# Patient Record
Sex: MALE | Race: WHITE | Employment: PART TIME | URBAN - METROPOLITAN AREA
[De-identification: names, ages, dates, MRNs, and addresses within clinical notes are randomized per-mention and may not be internally consistent; named-entity substitution may affect disease eponyms.]

---
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
## 2022-07-11 ENCOUNTER — OFFICE VISIT (OUTPATIENT)
Dept: ORTHOPEDIC SURGERY | Age: 22
End: 2022-07-11
Payer: COMMERCIAL

## 2022-07-11 VITALS — BODY MASS INDEX: 31.29 KG/M2 | HEIGHT: 77 IN | WEIGHT: 265 LBS

## 2022-07-11 DIAGNOSIS — M23.200 OLD PERIPHERAL TEAR OF LATERAL MENISCUS OF RIGHT KNEE: ICD-10-CM

## 2022-07-11 DIAGNOSIS — M25.561 ACUTE PAIN OF RIGHT KNEE: Primary | ICD-10-CM

## 2022-07-11 PROCEDURE — 99204 OFFICE O/P NEW MOD 45 MIN: CPT | Performed by: ORTHOPAEDIC SURGERY

## 2022-07-11 NOTE — PROGRESS NOTES
ASSESSMENT/PLAN:  Below is the assessment and plan developed based on review of pertinent history, physical exam, labs, studies, and medications. 1. Acute pain of right knee  -     XR KNEE RT MIN 4 V; Future  2. Old peripheral tear of lateral meniscus of right knee  -     MRI KNEE RT WO CONT; Future      Return for Follow-up after diagnostic test.    In discussion with the patient, we considered the numerus possible diagnoses that could be contributing to their present symptoms. We also deliberated on the extensive management options that must be considered to treat their current condition. We reviewed their accessible prior medical records, diagnostic tests, and current health and employment information. We considered how these symptoms were affecting the patient´s activities of daily living as well as employment and fitness activities. The patient had various questions regarding the possible risks, benefits, complications, morbidity and mortality regarding their diagnosis and treatment options. The patients´ comorbidities were considered, and I advocated that they consider maximizing lifestyle modification through nutrition and exercise to aid in addressing their symptoms. Shared decision making yielded an understanding to move forward with conservation treatment preferences. The patient expressed understanding that if conservative management fails to alleviate the present symptoms they will return to office for re-evaluation and consideration of additional diagnostic tests and potential surgical options. In the interim, we have recommended ice, elevation, and take prescription anti-inflammatory medications along with a physician directed home exercise program. We discussed the risks and common side effects of anti-inflammatory medications and instructed the patient to discontinue the medication and contact us if they experienced any side effects.  The patient was encouraged to discuss the possible side effects with their family physician or pharmacist prior to initiating any new medications. We discussed the fact that many of the recommended treatment options presented are significantly limited by the patient´s social determinants of health. We also reviewed the circumstances surrounding the environment that they live and work which affect a wide range of health risk. We considered the limited access to appropriate educational resources regarding proper nutrition and exercise as well as the economic and social support necessary to maintain health and wellbeing. Given that the patient's symptoms are increasing in frequency and duration, we have decided to evaluate the etiology of the pain and loss of function with an MRI. We discussed the risks of an MRI which include, but are not limited to the enclosed space, noisy environment, magnetic effect on implanted metal. We also talked about the fact that MRI is also contraindicated in the presence of internal metallic objects such as bullets or shrapnel, as well as surgical clips, pins, plates, screws, metal sutures, or wire mesh. We talked about the fact that MRI does not use radiation, but it may be contrindicated if the patient has implanted pacemakers, intracranial aneurysm clips, cochlear implants, certain prosthetic devices, implanted drug infusion pumps, neurostimulators, bone-growth stimulators, certain intrauterine contraceptive devices; or any other type of iron-based metal implants. We discussed the fact that if you are pregnant or suspect that you may be pregnant, you should notify your physician and consult with your primary care or obstetrician before having an MRI. Although rare, we talked about the fact that if contrast dye is used, there is a risk for allergic reaction to the dye.  Patients who are allergic to or sensitive to medications, contrast dye, iodine, or shellfish should notify the radiologist or technologist prior to the administration of dye. MRI contrast may also influence other conditions such as allergies, asthma, anemia, hypotension (low blood pressure), and sickle cell disease. The patient has expressed understanding of these risks and I will see the patient back after the MRI to discuss the findings as well as the treatment options. We talked about the fact that was concern for recurrence of his lateral meniscus tear of his right knee. We will proceed with an MRI of his right knee to further evaluate his meniscus. We went over multiple scenarios on return to play. I am happy to talk to his parents. SUBJECTIVE/OBJECTIVE:  Fitz Yates (: 2000) is a 22 y.o. male, patient,here for evaluation of the Knee Pain (right)  . Patient presents today for evaluation of his right knee. He states that he had a right knee arthroscopy with lateral meniscus repair in 2021 in [place]. States that he did well initially but has continued to have some sharp pain on the lateral aspect of the knee on a daily basis. He states that the pain is short-lived but it is sharp. States that he notices when he gets up from a seated position or when he rotates. He denies any catching or locking. He denies any pain that goes down the leg and denies any numbness or tingling. 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    Physical Exam    General: Well-dressed well-nourished male in no acute distress, alert and oriented x3  Right knee: Small effusion on exam.  Does have some mild prepatellar bursitis. Motion from 0 to 135 degrees. Mild medial joint line tenderness along with some moderate lateral joint line tenderness. Knee is stable to varus valgus stress testing. Negative Lachman's, negative anterior/posterior drawer. Calf is soft and supple without swelling or tenderness. Sensation is intact to light touch over the leg and distally in the foot.     Imagin views of the right straits no fractures or dislocations. Well-maintained medial, lateral, patellofemoral joint spaces. No Known Allergies    No current outpatient medications on file. No current facility-administered medications for this visit. History reviewed. No pertinent past medical history. Past Surgical History:   Procedure Laterality Date    HX KNEE ARTHROSCOPY         Family History   Problem Relation Age of Onset    No Known Problems Mother     No Known Problems Father     No Known Problems Maternal Grandmother     No Known Problems Maternal Grandfather     No Known Problems Paternal Grandmother     No Known Problems Paternal Grandfather        Social History     Socioeconomic History    Marital status: SINGLE     Spouse name: Not on file    Number of children: Not on file    Years of education: Not on file    Highest education level: Not on file   Occupational History    Not on file   Tobacco Use    Smoking status: Current Some Day Smoker    Smokeless tobacco: Never Used   Vaping Use    Vaping Use: Never used   Substance and Sexual Activity    Alcohol use: Yes    Drug use: No    Sexual activity: Never   Other Topics Concern    Not on file   Social History Narrative    Not on file     Social Determinants of Health     Financial Resource Strain:     Difficulty of Paying Living Expenses: Not on file   Food Insecurity:     Worried About Running Out of Food in the Last Year: Not on file    [place] of Food in the Last Year: Not on file   Transportation Needs:     Lack of Transportation (Medical): Not on file    Lack of Transportation (Non-Medical):  Not on file   Physical Activity:     Days of Exercise per Week: Not on file    Minutes of Exercise per Session: Not on file   Stress:     Feeling of Stress : Not on file   Social Connections:     Frequency of Communication with Friends and Family: Not on file    Frequency of Social Gatherings with Friends and Family: Not on file    Attends Scientologist Services: Not on file    Active Member of Clubs or Organizations: Not on file    Attends Club or Organization Meetings: Not on file    Marital Status: Not on file   Intimate Partner Violence:     Fear of Current or Ex-Partner: Not on file    Emotionally Abused: Not on file    Physically Abused: Not on file    Sexually Abused: Not on file   Housing Stability:     Unable to Pay for Housing in the Last Year: Not on file    Number of [place] in the Last Year: Not on file    Unstable Housing in the Last Year: Not on file       Review of Systems    No flowsheet data found. Vitals:  Ht 6' 5\" (1.956 m)   Wt 265 lb (120.2 kg)   BMI 31.42 kg/m²    Body mass index is 31.42 kg/m². An electronic signature was used to authenticate this note.   -- Alida Vasquez MD [facility] Pregnancy [facility] Lactation Text: The risk during pregnancy and breastfeeding is uncertain with this medication.

## 2022-07-15 PROBLEM — S83.281A TEAR OF LATERAL MENISCUS OF RIGHT KNEE: Status: ACTIVE | Noted: 2022-07-15

## 2022-07-15 NOTE — PROGRESS NOTES
ASSESSMENT/PLAN:  Below is the assessment and plan developed based on review of pertinent history, physical exam, labs, studies, and medications. 1. Acute pain of right knee  -     XR KNEE RT MIN 4 V; Future  2. Old peripheral tear of lateral meniscus of right knee  -     MRI KNEE RT WO CONT; Future      We discussed the treatment options for the patient´s diagnosis, which included: living with the extremity as it is, organized exercises, medicines, injections, and surgical options. Utilizing shared treatment decision-making; we also discussed the nature and purpose of the treatment options along with the expected risks and benefits. The patient has expressed a desire to proceed with surgery, and I think that is a reasonable option. I educated the patient regarding the inherent and unavoidable risks which include, but are not limited to anesthesia, infection, damage to nerves and blood vessels, blood loss, blood clots, and even death were discussed at length. We also talked about the possibility of not being able to return to prior activities or employment, the need for future surgery, and complex regional pain syndrome. The patient expressed understanding of these risks and has elected to proceed with surgery. Ample time was given for questions, of which many were addressed. We have discussed the surgical procedure as well as the realistic expectations regarding the risks, outcome and post-operative protocol. We will set this up when it is convenient for the patient. We have instructed them to contact us if there are any questions or concerns between now and their date of surgery. We talked about procedure as well as postoperative protocol in detail. He is can look at his calendar and call us to schedule right knee arthroscopy with partial lateral meniscectomy.   We talked about the fact that he could be back as early as 2 to 3 weeks if his swelling and pain were minimal.  I also did talk to them about nonoperative measures including a cortisone shot to try to get through the season. They can think about their options and get back to us. SUBJECTIVE/OBJECTIVE:  Kit Client (: 2000) is a 22 y.o. male, patient,here for evaluation of the Knee Pain (right)  . Patient presents today for evaluation of his right knee. He states that he had a right knee arthroscopy with lateral meniscus repair in 2021 in [place]. States that he did well initially but has continued to have some sharp pain on the lateral aspect of the knee on a daily basis. He states that the pain is short-lived but it is sharp. States that he notices when he gets up from a seated position or when he rotates. He denies any catching or locking. He denies any pain that goes down the leg and denies any numbness or tingling. 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    Physical Exam    General: Well-dressed well-nourished male in no acute distress, alert and oriented x3  Right knee: Small effusion on exam.  Does have some mild prepatellar bursitis. Motion from 0 to 135 degrees. Mild medial joint line tenderness along with some moderate lateral joint line tenderness. Knee is stable to varus valgus stress testing. Negative Lachman's, negative anterior/posterior drawer. Calf is soft and supple without swelling or tenderness. Sensation is intact to light touch over the leg and distally in the foot. Imaging:    I independently reviewed the images and report. MRI KNEE RT WO CONT  Narrative: EXAM: MRI KNEE RT WO CONT    INDICATION: Right knee pain    COMPARISON: Radiographs 2022    TECHNIQUE: Axial T2 fat-saturated and proton density fat-saturated; coronal T1  and proton density fat-saturated; and sagittal T2 fat-saturated, proton density  fat-saturated, and gradient echo MRI of the right knee . CONTRAST: None. FINDINGS: Bone marrow:  There is an incidental small sclerotic lesion that is  primarily based in the distal femoral metadiaphysis likely representing an  incidental healed NOF. No acute fracture, dislocation, or marrow replacing  process. Joint fluid: No significant joint effusion. Collateral ligaments and posterior, lateral corner: Intact. Medial meniscus: There is complex tearing in the body having both free edge and  horizontal components. Lateral meniscus: Intact. ACL and PCL: Intact. Tendons: Intact. Muscles: Within normal limits. Patellofemoral alignment: There is mild lateral patellar tilting and  subluxation. Trochlear groove is not hypoplastic. TT-TG distance: 15 mm    Articular cartilage: Intact. No focal osteochondral lesion. Soft tissue mass: None. Impression: Complex tear of the body of the lateral meniscus. No Known Allergies    No current outpatient medications on file. No current facility-administered medications for this visit. History reviewed. No pertinent past medical history. Past Surgical History:   Procedure Laterality Date    HX KNEE ARTHROSCOPY         Family History   Problem Relation Age of Onset    No Known Problems Mother     No Known Problems Father     No Known Problems Maternal Grandmother     No Known Problems Maternal Grandfather     No Known Problems Paternal Grandmother     No Known Problems Paternal Grandfather        Social History     Socioeconomic History    Marital status: SINGLE     Spouse name: Not on file    Number of children: Not on file    Years of education: Not on file    Highest education level: Not on file   Occupational History    Not on file   Tobacco Use    Smoking status: Current Some Day Smoker    Smokeless tobacco: Never Used   Vaping Use    Vaping Use: Never used   Substance and Sexual Activity    Alcohol use:  Yes    Drug use: No    Sexual activity: Never   Other Topics Concern    Not on file   Social History Narrative    Not on file     Social Determinants of Health     Financial Resource Strain:     Difficulty of Paying Living Expenses: Not on file   Food Insecurity:     Worried About Running Out of Food in the Last Year: Not on file    [place] of Food in the Last Year: Not on file   Transportation Needs:     Lack of Transportation (Medical): Not on file    Lack of Transportation (Non-Medical): Not on file   Physical Activity:     Days of Exercise per Week: Not on file    Minutes of Exercise per Session: Not on file   Stress:     Feeling of Stress : Not on file   Social Connections:     Frequency of Communication with Friends and Family: Not on file    Frequency of Social Gatherings with Friends and Family: Not on file    Attends Mormonism Services: Not on file    Active Member of [address] [place] [address] [employer] or Organizations: Not on file    Attends Club or Organization Meetings: Not on file    Marital Status: Not on file   Intimate Partner Violence:     Fear of Current or Ex-Partner: Not on file    Emotionally Abused: Not on file    Physically Abused: Not on file    Sexually Abused: Not on file   Housing Stability:     Unable to Pay for Housing in the Last Year: Not on file    Number of [place] in the Last Year: Not on file    Unstable Housing in the Last Year: Not on file       Review of Systems    No flowsheet data found. Vitals:  Ht 6' 5\" (1.956 m)   Wt 265 lb (120.2 kg)   BMI 31.42 kg/m²    Body mass index is 31.42 kg/m². An electronic signature was used to authenticate this note.   -- Jozef Greene MD

## 2022-07-18 ENCOUNTER — OFFICE VISIT (OUTPATIENT)
Dept: ORTHOPEDIC SURGERY | Age: 22
End: 2022-07-18
Payer: COMMERCIAL

## 2022-07-18 DIAGNOSIS — S83.281S TEAR OF LATERAL MENISCUS OF RIGHT KNEE, UNSPECIFIED TEAR TYPE, UNSPECIFIED WHETHER OLD OR CURRENT TEAR, SEQUELA: Primary | ICD-10-CM

## 2022-07-18 PROCEDURE — 99214 OFFICE O/P EST MOD 30 MIN: CPT | Performed by: ORTHOPAEDIC SURGERY

## 2022-07-19 VITALS — HEIGHT: 77 IN | BODY MASS INDEX: 31.29 KG/M2 | WEIGHT: 265 LBS

## 2022-07-25 DIAGNOSIS — M23.200 OLD PERIPHERAL TEAR OF LATERAL MENISCUS OF RIGHT KNEE: Primary | ICD-10-CM

## 2022-07-25 RX ORDER — OXYCODONE AND ACETAMINOPHEN 5; 325 MG/1; MG/1
1 TABLET ORAL
Qty: 42 TABLET | Refills: 0 | Status: SHIPPED | OUTPATIENT
Start: 2022-07-25 | End: 2022-08-01

## 2022-07-29 NOTE — PROGRESS NOTES
ASSESSMENT/PLAN:  Below is the assessment and plan developed based on review of pertinent history, physical exam, labs, studies, and medications. 1. Old peripheral tear of lateral meniscus of right knee  -     REFERRAL TO PHYSICAL THERAPY      Return in about 3 weeks (around 2022). After discussing treatment options, we have decided to proceed with formal physical therapy as well as a home exercise program for rehabilitation of the knee. We went over the arthroscopic pictures and removed the stitches during today´s visit. We will continue with ice and elevation of the knee to decrease swelling and pain. We will continue to utilize early mobilization and mechanical prophylaxis to reduce the chances of a deep vein thrombosis. We will wean them off any narcotic medications and progress to anti-inflammatories and Tylenol as long as there are no contraindications to these medications. We also discussed the risk and benefits and common side effects of taking these medications at today´s visit. We also had a discussion regarding not driving while on narcotic medications and while impaired from a surgical or medical condition. I will see them back in three weeks to evaluate their progress. They will call us in the interim if they have any questions or concerns prior to their follow up visit. SUBJECTIVE/OBJECTIVE:  Sarah Garcia (: 2000) is a 25 y.o. male, patient,here for evaluation of the Surgical Follow-up and Knee Pain (right)  . Patient returns today for follow-up of his right knee. He underwent right knee arthroscopy with partial lateral meniscectomy and synovectomy on 2022. He has been icing elevating. He has been weightbearing as tolerated. He has been off all pain medications. He denies any numbness tingling erythema or warmth. Physical Exam    Examination of the operative knee reveals that there is a small effusion.  The incisions are well healed, without evidence of drainage, erythema, or warmth. Range of motion and strength are progressing appropriately at this stage of rehabilitation. Strength distally is 5/5. There is no calf tenderness and a negative Mk´s sign. Sensation is intact to light touch distally and there is a brisk capillary refill. No Known Allergies    Current Outpatient Medications   Medication Sig    Claravis 30 mg cap TAKE 1 CAPSULE BY MOUTH EVERY DAY WITH FOOD     No current facility-administered medications for this visit. History reviewed. No pertinent past medical history. Past Surgical History:   Procedure Laterality Date    HX KNEE ARTHROSCOPY         Family History   Problem Relation Age of Onset    No Known Problems Mother     No Known Problems Father     No Known Problems Maternal Grandmother     No Known Problems Maternal Grandfather     No Known Problems Paternal Grandmother     No Known Problems Paternal Grandfather        Social History     Socioeconomic History    Marital status: SINGLE     Spouse name: Not on file    Number of children: Not on file    Years of education: Not on file    Highest education level: Not on file   Occupational History    Not on file   Tobacco Use    Smoking status: Some Days    Smokeless tobacco: Never   Vaping Use    Vaping Use: Never used   Substance and Sexual Activity    Alcohol use: Yes    Drug use: No    Sexual activity: Never   Other Topics Concern    Not on file   Social History Narrative    Not on file     Social Determinants of Health     Financial Resource Strain: Not on file   Food Insecurity: Not on file   Transportation Needs: Not on file   Physical Activity: Not on file   Stress: Not on file   Social Connections: Not on file   Intimate Partner Violence: Not on file   Housing Stability: Not on file       Review of Systems    No flowsheet data found. Vitals:  Ht 6' 3\" (1.905 m)   Wt 265 lb (120.2 kg)   BMI 33.12 kg/m²    Body mass index is 33.12 kg/m².           An electronic signature was used to authenticate this note.   -- Matthew Boone MD

## 2022-08-01 ENCOUNTER — OFFICE VISIT (OUTPATIENT)
Dept: ORTHOPEDIC SURGERY | Age: 22
End: 2022-08-01
Payer: COMMERCIAL

## 2022-08-01 VITALS — BODY MASS INDEX: 32.95 KG/M2 | WEIGHT: 265 LBS | HEIGHT: 75 IN

## 2022-08-01 DIAGNOSIS — M23.200 OLD PERIPHERAL TEAR OF LATERAL MENISCUS OF RIGHT KNEE: Primary | ICD-10-CM

## 2022-08-01 PROCEDURE — 99024 POSTOP FOLLOW-UP VISIT: CPT | Performed by: ORTHOPAEDIC SURGERY

## 2022-08-01 RX ORDER — ISOTRETINOIN 30 MG/1
CAPSULE, LIQUID FILLED ORAL
COMMUNITY
Start: 2022-07-18

## 2022-08-18 NOTE — PROGRESS NOTES
ASSESSMENT/PLAN:  Below is the assessment and plan developed based on review of pertinent history, physical exam, labs, studies, and medications. 1. Old peripheral tear of lateral meniscus of right knee  -     REFERRAL TO PHYSICAL THERAPY      Return in about 3 weeks (around 2022). After discussing treatment options, we have decided to proceed with formal physical therapy as well as a home exercise program for rehabilitation of the knee. We went over the arthroscopic pictures and removed the stitches during today´s visit. We will continue with ice and elevation of the knee to decrease swelling and pain. We will continue to utilize early mobilization and mechanical prophylaxis to reduce the chances of a deep vein thrombosis. We will wean them off any narcotic medications and progress to anti-inflammatories and Tylenol as long as there are no contraindications to these medications. We also discussed the risk and benefits and common side effects of taking these medications at today´s visit. We also had a discussion regarding not driving while on narcotic medications and while impaired from a surgical or medical condition. I will see them back in three weeks to evaluate their progress. They will call us in the interim if they have any questions or concerns prior to their follow up visit. SUBJECTIVE/OBJECTIVE:  Sylwia Walters (: 2000) is a 25 y.o. male, patient,here for evaluation of the Surgical Follow-up and Knee Pain (right)  . Patient returns today for follow-up of his right knee. He underwent right knee arthroscopy with partial lateral meniscectomy and synovectomy on 2022. He has been icing elevating. He has been weightbearing as tolerated. He has been off all pain medications. He denies any numbness tingling erythema or warmth. Physical Exam    Examination of the operative knee reveals that there is a small effusion.  The incisions are well healed, without evidence of drainage, erythema, or warmth. Range of motion and strength are progressing appropriately at this stage of rehabilitation. Strength distally is 5/5. There is no calf tenderness and a negative Mk´s sign. Sensation is intact to light touch distally and there is a brisk capillary refill. No Known Allergies    Current Outpatient Medications   Medication Sig    Claravis 30 mg cap TAKE 1 CAPSULE BY MOUTH EVERY DAY WITH FOOD     No current facility-administered medications for this visit. History reviewed. No pertinent past medical history. Past Surgical History:   Procedure Laterality Date    HX KNEE ARTHROSCOPY         Family History   Problem Relation Age of Onset    No Known Problems Mother     No Known Problems Father     No Known Problems Maternal Grandmother     No Known Problems Maternal Grandfather     No Known Problems Paternal Grandmother     No Known Problems Paternal Grandfather        Social History     Socioeconomic History    Marital status: SINGLE     Spouse name: Not on file    Number of children: Not on file    Years of education: Not on file    Highest education level: Not on file   Occupational History    Not on file   Tobacco Use    Smoking status: Some Days    Smokeless tobacco: Never   Vaping Use    Vaping Use: Never used   Substance and Sexual Activity    Alcohol use: Yes    Drug use: No    Sexual activity: Never   Other Topics Concern    Not on file   Social History Narrative    Not on file     Social Determinants of Health     Financial Resource Strain: Not on file   Food Insecurity: Not on file   Transportation Needs: Not on file   Physical Activity: Not on file   Stress: Not on file   Social Connections: Not on file   Intimate Partner Violence: Not on file   Housing Stability: Not on file       Review of Systems    No flowsheet data found. Vitals:  Ht 6' 3\" (1.905 m)   Wt 265 lb (120.2 kg)   BMI 33.12 kg/m²    Body mass index is 33.12 kg/m².           An electronic signature was used to authenticate this note.   -- Rosa Elena Forman MD

## 2022-08-19 ENCOUNTER — OFFICE VISIT (OUTPATIENT)
Dept: ORTHOPEDIC SURGERY | Age: 22
End: 2022-08-19
Payer: COMMERCIAL

## 2022-08-19 VITALS
DIASTOLIC BLOOD PRESSURE: 65 MMHG | SYSTOLIC BLOOD PRESSURE: 110 MMHG | BODY MASS INDEX: 32.95 KG/M2 | WEIGHT: 265 LBS | HEIGHT: 75 IN

## 2022-08-19 DIAGNOSIS — M23.200 OLD PERIPHERAL TEAR OF LATERAL MENISCUS OF RIGHT KNEE: Primary | ICD-10-CM

## 2022-08-19 PROCEDURE — 99024 POSTOP FOLLOW-UP VISIT: CPT | Performed by: ORTHOPAEDIC SURGERY

## 2022-10-05 NOTE — PROGRESS NOTES
ASSESSMENT/PLAN:  Below is the assessment and plan developed based on review of pertinent history, physical exam, labs, studies, and medications. 1. Old peripheral tear of lateral meniscus of right knee  -     REFERRAL TO PHYSICAL THERAPY      We talked about starting an anti-inflammatory and continue with hamstring stretching. If he continues to have discomfort at the 3-month winnie we will proceed with an MRI of his knee when he is home for [facility] break. SUBJECTIVE/OBJECTIVE:  Clay Ordonez (: 2000) is a 25 y.o. male, patient,here for evaluation of the Surgical Follow-up and Knee Pain (right)  . Patient returns today for follow-up of his right knee. He underwent right knee arthroscopy with partial lateral meniscectomy and synovectomy on 2022. He has been icing elevating. He has been weightbearing as tolerated. He has been off all pain medications. He denies any numbness tingling erythema or warmth. He reports he has been participating in low impact that he still having discomfort particularly on lateral joint line. He denies any swelling or mechanical symptoms. Physical Exam    Examination of the operative knee reveals that there is a small effusion. The incisions are well healed, without evidence of drainage, erythema, or warmth. Range of motion and strength are progressing appropriately at this stage of rehabilitation. Strength distally is 5/5. There is no calf tenderness and a negative Mk´s sign. Sensation is intact to light touch distally and there is a brisk capillary refill. No Known Allergies    Current Outpatient Medications   Medication Sig    Claravis 30 mg cap TAKE 1 CAPSULE BY MOUTH EVERY DAY WITH FOOD     No current facility-administered medications for this visit. History reviewed. No pertinent past medical history.     Past Surgical History:   Procedure Laterality Date    HX KNEE ARTHROSCOPY         Family History   Problem Relation Age of Onset No Known Problems Mother     No Known Problems Father     No Known Problems Maternal Grandmother     No Known Problems Maternal Grandfather     No Known Problems Paternal Grandmother     No Known Problems Paternal Grandfather        Social History     Socioeconomic History    Marital status: SINGLE     Spouse name: Not on file    Number of children: Not on file    Years of education: Not on file    Highest education level: Not on file   Occupational History    Not on file   Tobacco Use    Smoking status: Some Days    Smokeless tobacco: Never   Vaping Use    Vaping Use: Never used   Substance and Sexual Activity    Alcohol use: Yes    Drug use: No    Sexual activity: Never   Other Topics Concern    Not on file   Social History Narrative    Not on file     Social Determinants of Health     Financial Resource Strain: Not on file   Food Insecurity: Not on file   Transportation Needs: Not on file   Physical Activity: Not on file   Stress: Not on file   Social Connections: Not on file   Intimate Partner Violence: Not on file   Housing Stability: Not on file       Review of Systems    No flowsheet data found. Vitals:  Ht 6' 3\" (1.905 m)   Wt 265 lb (120.2 kg)   BMI 33.12 kg/m²    Body mass index is 33.12 kg/m². An electronic signature was used to authenticate this note.   -- Chele Benitez MD

## 2022-10-07 ENCOUNTER — OFFICE VISIT (OUTPATIENT)
Dept: ORTHOPEDIC SURGERY | Age: 22
End: 2022-10-07
Payer: COMMERCIAL

## 2022-10-07 VITALS — BODY MASS INDEX: 31.29 KG/M2 | WEIGHT: 265 LBS | HEIGHT: 77 IN

## 2022-10-07 DIAGNOSIS — M23.200 OLD PERIPHERAL TEAR OF LATERAL MENISCUS OF RIGHT KNEE: Primary | ICD-10-CM

## 2022-10-07 PROCEDURE — 99024 POSTOP FOLLOW-UP VISIT: CPT | Performed by: ORTHOPAEDIC SURGERY
